# Patient Record
Sex: FEMALE | Race: WHITE | Employment: OTHER | URBAN - NONMETROPOLITAN AREA
[De-identification: names, ages, dates, MRNs, and addresses within clinical notes are randomized per-mention and may not be internally consistent; named-entity substitution may affect disease eponyms.]

---

## 2019-01-01 ENCOUNTER — HOSPITAL ENCOUNTER (EMERGENCY)
Facility: OTHER | Age: 74
End: 2019-05-02
Attending: FAMILY MEDICINE | Admitting: FAMILY MEDICINE
Payer: MEDICARE

## 2019-01-01 ENCOUNTER — ANESTHESIA (OUTPATIENT)
Dept: EMERGENCY MEDICINE | Facility: OTHER | Age: 74
End: 2019-01-01
Payer: MEDICARE

## 2019-01-01 ENCOUNTER — ANESTHESIA EVENT (OUTPATIENT)
Dept: EMERGENCY MEDICINE | Facility: OTHER | Age: 74
End: 2019-01-01
Payer: MEDICARE

## 2019-01-01 VITALS
OXYGEN SATURATION: 85 % | DIASTOLIC BLOOD PRESSURE: 23 MMHG | HEART RATE: 101 BPM | SYSTOLIC BLOOD PRESSURE: 174 MMHG | WEIGHT: 154.32 LBS

## 2019-01-01 DIAGNOSIS — R57.0: ICD-10-CM

## 2019-01-01 DIAGNOSIS — I46.9 CARDIAC ARREST (H): ICD-10-CM

## 2019-01-01 DIAGNOSIS — R09.2 RESPIRATORY ARREST (H): ICD-10-CM

## 2019-01-01 PROCEDURE — 25000132 ZZH RX MED GY IP 250 OP 250 PS 637

## 2019-01-01 PROCEDURE — 96375 TX/PRO/DX INJ NEW DRUG ADDON: CPT | Mod: XU | Performed by: FAMILY MEDICINE

## 2019-01-01 PROCEDURE — 99285 EMERGENCY DEPT VISIT HI MDM: CPT | Mod: Z6 | Performed by: FAMILY MEDICINE

## 2019-01-01 PROCEDURE — 31500 INSERT EMERGENCY AIRWAY: CPT | Performed by: FAMILY MEDICINE

## 2019-01-01 PROCEDURE — 31500 INSERT EMERGENCY AIRWAY: CPT | Performed by: NURSE ANESTHETIST, CERTIFIED REGISTERED

## 2019-01-01 PROCEDURE — A9270 NON-COVERED ITEM OR SERVICE: HCPCS

## 2019-01-01 PROCEDURE — 92950 HEART/LUNG RESUSCITATION CPR: CPT | Mod: Z6 | Performed by: FAMILY MEDICINE

## 2019-01-01 PROCEDURE — 25000128 H RX IP 250 OP 636

## 2019-01-01 PROCEDURE — 93010 ELECTROCARDIOGRAM REPORT: CPT | Performed by: INTERNAL MEDICINE

## 2019-01-01 PROCEDURE — 99285 EMERGENCY DEPT VISIT HI MDM: CPT | Mod: 25 | Performed by: FAMILY MEDICINE

## 2019-01-01 PROCEDURE — 40000275 ZZH STATISTIC RCP TIME EA 10 MIN

## 2019-01-01 PROCEDURE — 25000125 ZZHC RX 250

## 2019-01-01 PROCEDURE — 96374 THER/PROPH/DIAG INJ IV PUSH: CPT | Mod: XU | Performed by: FAMILY MEDICINE

## 2019-01-01 PROCEDURE — 93005 ELECTROCARDIOGRAM TRACING: CPT | Mod: XU | Performed by: FAMILY MEDICINE

## 2019-01-01 PROCEDURE — 92950 HEART/LUNG RESUSCITATION CPR: CPT | Performed by: FAMILY MEDICINE

## 2019-05-02 NOTE — ED NOTES
Pt intubated, CO 2 detector placed, reads 0 t/o code, bilateral breath sounds, CRNA and respiratory aware and confident in placement.

## 2019-05-02 NOTE — ED PROVIDER NOTES
History     Chief Complaint   Patient presents with     Shortness of Breath     HPI  Shayy aLo is a 73 year old female who presents to ER by EMS for evaluation of shortness of breath . Per report patient had had 2 days of cough and shortness of breath and at time of EMS arrival oxygen saturations at 80 percent . Oxygent placed at 5 liters and by time of arrival to ER .    Patient denies any medical history . Not on any medications . Shorttly after initiating H and P patient became suddenly unresponsive . Code Blue called   Allergies:  Allergies not on file    Problem List:    There are no active problems to display for this patient.       Past Medical History:    No past medical history on file.    Past Surgical History:    No past surgical history on file.    Family History:    No family history on file.    Social History:  Marital Status:    Social History     Tobacco Use     Smoking status: Not on file   Substance Use Topics     Alcohol use: Not on file     Drug use: Not on file        Medications:      No current outpatient medications on file.      Review of Systems   Unable to perform ROS: Patient unresponsive       Physical Exam          Physical Exam   Constitutional:   Mottled from shoulder blades up , sharp demarcation .    HENT:   Head: Normocephalic and atraumatic.   Diminished gag    Eyes:   Pupils dilated sluggish    Neck: No JVD present. No tracheal deviation present.   Cardiovascular:   No murmur heard.  Heart rate initially normal but rapid decline to sinus bradycardia to agonal with PEA    Pulmonary/Chest: No stridor. No respiratory distress. She has no wheezes. She has no rales. She exhibits no tenderness.   Absent respiratory effort , Once intubated equal breath sounds . Clear without crackles    Abdominal: Soft. She exhibits no distension.   Lymphadenopathy:     She has no cervical adenopathy.   Neurological:   Unresponsive    Skin: Skin is warm.   Pulseless    Nursing note and vitals  reviewed.      ED Course        Procedures               EKG Interpretation:      Interpreted by Lizandro Sifuentes  Time reviewed: 1350  Symptoms at time of EKG: shortness of breath   Rhythm: normal sinus   Rate: Normal  Axis: Normal  Ectopy: none  Conduction: normal  ST Segments/ T Waves: T wave inversion aVF, V1, V2, V3 and V4  Q Waves: III  Comparison to prior: No old EKG available    Clinical Impression: myocardial ischemia   Patient presented to ER with complaint of 2 days of shortness of breath . Shortly following arrival patient became unresponsive and deteriorated into PEA .  Code Blue called . Patient intubated by anesthesia at 1351. Prior to arrest patient developed sinus bradycardia 1 mg atroprine at 1349, 1 mg [id number]  administered. Continued deterioration . NO pulse 1354 CPR initiated. Based on exam most likely diagnosis massive PE vs anterior MI . Heparin started at 1359. Levophed for pressure support at 1403. Epi repeated every 3 minutes per ACLS protocol  .    Bicarb given after 20 minutes of resuscitation . . Vfib at 1410 , shock at 200 joules , CPR resumed. PUlse recheck asystole. Lytics administered after consultation with cardiology at [facility] few organized beats during CPR but continued asystole at rhythm checks. After discussion with spouse code called at 1439 with most likely cause of death massive PE vs massive anterior MI . No labs drawn as unable despite efforts. NO chest xray as unable to interuppt CPR                    No results found for this or any previous visit (from the past 24 hour(s)).    Medications - No data to display    Assessments & Plan (with Medical Decision Making)     I have reviewed the nursing notes.    I have reviewed the findings, diagnosis, plan and need for follow up with the patient.         Medication List      There are no discharge medications for this visit.         Final diagnoses:   Respiratory arrest (H)   Cardiac arrest (H)    Cardiovascular collapse (H)       5/2/2019   [facility] Zuleyma Bone MD  05/02/19 [id number]

## 2019-05-02 NOTE — ED NOTES
Pt appears more pale, mottling to chest and fingers, pt becomes unresponsive, code blue called, see code blue record.

## 2019-05-02 NOTE — ED NOTES
"Pt becomes responsive to painful stimuli for approximately 45 seconds, states \"I just feel awful\". Pt then again becomes unresponsive again, see code blue sheet.  "

## 2019-05-02 NOTE — ED TRIAGE NOTES
PT to ER via EMS, pt has not been feeling well for 2 days, cough and dyspnea on exertion. Pt arrives pale, diaphoretic, sats 88% on RA, placed on O2 via NC.  EMS states pt 80's%, 91% on 5L via NC.  Pt vomited on EMS arrival, 4mg zofran and 500ml NS administered by EMS.  RT at bedside on pt arrival.

## 2019-05-02 NOTE — ED NOTES
EMS reports that pt has no medical history, takes no scheduled medications, took tylenol and robitussin today.